# Patient Record
Sex: MALE | URBAN - METROPOLITAN AREA
[De-identification: names, ages, dates, MRNs, and addresses within clinical notes are randomized per-mention and may not be internally consistent; named-entity substitution may affect disease eponyms.]

---

## 2018-08-16 ENCOUNTER — OFFICE VISIT (OUTPATIENT)
Dept: URBAN - METROPOLITAN AREA CLINIC 58 | Facility: CLINIC | Age: 73
End: 2018-08-16
Payer: COMMERCIAL

## 2018-08-16 PROCEDURE — 92014 COMPRE OPH EXAM EST PT 1/>: CPT | Performed by: OPTOMETRIST

## 2018-08-16 ASSESSMENT — INTRAOCULAR PRESSURE
OD: 14
OS: 12

## 2018-08-16 NOTE — IMPRESSION/PLAN
Impression: Other secondary cataract, left eye: H26.492. Plan: Discussed diagnosis with PT. Recommend YAG PCO eval. with cat surgeon

## 2018-08-16 NOTE — IMPRESSION/PLAN
Impression: Type 2 diabetes mellitus w/o complication: Z11.8. Plan: Diabetes type II: no background retinopathy, no signs of neovascularization noted. Discussed ocular and systemic benefits of blood sugar control.

## 2018-08-27 ENCOUNTER — OFFICE VISIT (OUTPATIENT)
Dept: URBAN - METROPOLITAN AREA CLINIC 58 | Facility: CLINIC | Age: 73
End: 2018-08-27
Payer: COMMERCIAL

## 2018-08-27 DIAGNOSIS — H26.492 OTHER SECONDARY CATARACT, LEFT EYE: Primary | ICD-10-CM

## 2018-08-27 PROCEDURE — 99214 OFFICE O/P EST MOD 30 MIN: CPT | Performed by: OPHTHALMOLOGY

## 2018-08-27 ASSESSMENT — INTRAOCULAR PRESSURE
OS: 15
OD: 15

## 2018-08-27 ASSESSMENT — KERATOMETRY
OS: 43.75
OD: 43.75

## 2018-08-27 ASSESSMENT — VISUAL ACUITY
OD: 20/30
OS: 20/25

## 2018-09-11 ENCOUNTER — SURGERY (OUTPATIENT)
Dept: URBAN - METROPOLITAN AREA SURGERY 32 | Facility: SURGERY | Age: 73
End: 2018-09-11
Payer: COMMERCIAL

## 2018-09-11 PROCEDURE — 66821 AFTER CATARACT LASER SURGERY: CPT | Performed by: OPHTHALMOLOGY

## 2018-09-19 ENCOUNTER — POST-OPERATIVE VISIT (OUTPATIENT)
Dept: URBAN - METROPOLITAN AREA CLINIC 58 | Facility: CLINIC | Age: 73
End: 2018-09-19

## 2018-09-19 DIAGNOSIS — Z09 ENCNTR FOR F/U EXAM AFT TRTMT FOR COND OTH THAN MALIG NEOPLM: Primary | ICD-10-CM

## 2018-09-19 PROCEDURE — 99024 POSTOP FOLLOW-UP VISIT: CPT | Performed by: OPTOMETRIST

## 2018-09-19 ASSESSMENT — VISUAL ACUITY
OS: 20/30+2
OD: 20/20-2

## 2019-01-22 ENCOUNTER — OFFICE VISIT (OUTPATIENT)
Dept: URBAN - METROPOLITAN AREA CLINIC 58 | Facility: CLINIC | Age: 74
End: 2019-01-22
Payer: MEDICARE

## 2019-01-22 DIAGNOSIS — E11.9 TYPE 2 DIABETES MELLITUS W/O COMPLICATION: Primary | ICD-10-CM

## 2019-01-22 PROCEDURE — 92014 COMPRE OPH EXAM EST PT 1/>: CPT | Performed by: OPHTHALMOLOGY

## 2019-01-22 ASSESSMENT — KERATOMETRY
OD: 44.00
OS: 43.88

## 2019-01-22 ASSESSMENT — INTRAOCULAR PRESSURE
OD: 12
OS: 13

## 2019-01-22 ASSESSMENT — VISUAL ACUITY
OS: 20/25
OD: 20/30

## 2019-01-22 NOTE — IMPRESSION/PLAN
Impression: Type 2 diabetes mellitus w/o complication: W55.9. Plan: Diabetes type II: no background retinopathy, no signs of neovascularization noted. Discussed ocular and systemic benefits of blood sugar control. Order refraction.

## 2019-08-14 ENCOUNTER — OFFICE VISIT (OUTPATIENT)
Dept: URBAN - METROPOLITAN AREA CLINIC 58 | Facility: CLINIC | Age: 74
End: 2019-08-14
Payer: MEDICARE

## 2019-08-14 DIAGNOSIS — H43.812 VITREOUS DETACHMENT OF LEFT EYE: Primary | ICD-10-CM

## 2019-08-14 PROCEDURE — 92014 COMPRE OPH EXAM EST PT 1/>: CPT | Performed by: OPTOMETRIST

## 2019-08-14 ASSESSMENT — INTRAOCULAR PRESSURE
OS: 13
OD: 13

## 2019-08-14 ASSESSMENT — KERATOMETRY
OD: 44.13
OS: 43.38

## 2019-08-14 NOTE — IMPRESSION/PLAN
Impression: Vitreous detachment of left eye: H43.812. Plan: Posterior vitreous detachment accounts for the patient's complaints. There is no evidence of retinal pathology. All signs and risks of retinal detachment and tears were discussed in detail. Patient instructed to call the office immediately if any symptoms noted. Recommend the patient return to office for follow up.

## 2022-12-08 NOTE — IMPRESSION/PLAN
Impression: Other secondary cataract, left eye: H26.492. Plan: Discussed treatment options with patient. YAG laser capsulotomy is recommended. Surgical risks and benefits were discussed in detail, explained, and understood by patient. Patient elects to have surgery.
Impression: Type 2 diabetes mellitus w/o complication: G38.3. Plan: Diabetes type II: no background retinopathy, no signs of neovascularization noted. Discussed ocular and systemic benefits of blood sugar control.
You can access the FollowMyHealth Patient Portal offered by [facility] by registering at the following website: [email or web address]. By joining [employer]’s FollowMyHealth portal, you will also be able to view your health information using other applications (apps) compatible with our system.